# Patient Record
Sex: FEMALE | Race: WHITE | URBAN - METROPOLITAN AREA
[De-identification: names, ages, dates, MRNs, and addresses within clinical notes are randomized per-mention and may not be internally consistent; named-entity substitution may affect disease eponyms.]

---

## 2021-04-06 ENCOUNTER — OFFICE VISIT (OUTPATIENT)
Dept: FAMILY MEDICINE CLINIC | Age: 36
End: 2021-04-06
Payer: COMMERCIAL

## 2021-04-06 VITALS
TEMPERATURE: 100.6 F | WEIGHT: 150 LBS | RESPIRATION RATE: 18 BRPM | OXYGEN SATURATION: 96 % | HEIGHT: 63 IN | BODY MASS INDEX: 26.58 KG/M2

## 2021-04-06 DIAGNOSIS — Z20.822 SUSPECTED COVID-19 VIRUS INFECTION: ICD-10-CM

## 2021-04-06 DIAGNOSIS — R50.9 FEVER, UNSPECIFIED FEVER CAUSE: ICD-10-CM

## 2021-04-06 DIAGNOSIS — B34.9 VIRAL ILLNESS: Primary | ICD-10-CM

## 2021-04-06 LAB
INFLUENZA A ANTIGEN, POC: NEGATIVE
INFLUENZA B ANTIGEN, POC: NEGATIVE
Lab: NORMAL
PERFORMING INSTRUMENT: NORMAL
QC PASS/FAIL: NORMAL
SARS-COV-2, POC: NORMAL

## 2021-04-06 PROCEDURE — 87426 SARSCOV CORONAVIRUS AG IA: CPT | Performed by: NURSE PRACTITIONER

## 2021-04-06 PROCEDURE — 99213 OFFICE O/P EST LOW 20 MIN: CPT | Performed by: NURSE PRACTITIONER

## 2021-04-06 PROCEDURE — 87804 INFLUENZA ASSAY W/OPTIC: CPT | Performed by: NURSE PRACTITIONER

## 2021-04-06 RX ORDER — ACETAMINOPHEN 325 MG/1
650 TABLET ORAL ONCE
Status: COMPLETED | OUTPATIENT
Start: 2021-04-06 | End: 2021-04-06

## 2021-04-06 RX ADMIN — ACETAMINOPHEN 650 MG: 325 TABLET ORAL at 16:19

## 2021-04-06 NOTE — PATIENT INSTRUCTIONS
- Increase fluids and rest       - Anti histamine PRN       - If congested may add sudafed or Zyrtec-D       - Zinc 220 mg daily X 7 days       -Vitamin C 500mg twice daily X 7 days       - Vitamin D 1000mg daily       - Mucinex 600mg twice daily X 10 days       -Patient must quarantine until test is resulted. If send out test is negative, may discontinue quarantine. If patient is positive on rapid testing or send out, they must strictly quarantine for 10 days from symptom onset and until afebrile without the use of Tylenol/NSAIDs for 24 hours and symptoms improved. -It is optimal for a standard 14-day quarantine period for people living, working, congregating together in close proximity. An alternative quarantine regimen is as follows: Persons who were exposed may test on day 5 or later post exposure, if testing is negative and they displayed no symptoms they may come out of quarantine on day 7 and continue to monitor for symptoms for total period of 14 days.

## 2021-04-06 NOTE — PROGRESS NOTES
Chief Complaint   Generalized Body Aches (this am), Fever (100.5 this am), and Cough      History of Present Illness   Source of history provided by: patient. René Fonseca is a 38 y.o. old female who presents to walk-in care for evaluation of fever X 2 days. Associated symptoms include nausea, cough, chills and body aches. Since onset symptoms have been worsening. Patient has had no known COVID exposure. They have not been diagnosed with COVID-19 in the past 90 days. The patient is not vaccinated. Has taken ibuprofen and claritin at home with some symptomatic relief. Denies any CP, dyspnea, LE edema, abdominal pain, vomiting, rash, or lethargy. Denies any hx of asthma, recurrent bronchitis, COPD, or pneumonia. Has no history of tobacco abuse. ROS   Pertinent positives and negatives are stated within HPI, all other systems reviewed and are negative. Past Medical History:  has a past medical history of Rupture of ovarian cyst.  Surgical History:  has a past surgical history that includes laparoscopy (12/18/12). Social History:  reports that she has never smoked. She has never used smokeless tobacco. She reports current alcohol use. She reports that she does not use drugs. Family History: family history includes Asthma in her mother; Cancer in her paternal grandmother; Diabetes in her paternal grandfather; Heart Disease in her maternal grandfather and maternal grandmother. Allergies: Augmentin [amoxicillin-pot clavulanate]    Physical Exam      VS:  Temp 100.6 °F (38.1 °C) (Temporal)   Resp 18   Ht 5' 3\" (1.6 m)   Wt 150 lb (68 kg)   SpO2 96%   BMI 26.57 kg/m²    Oxygen Saturation Interpretation: Normal.    Constitutional:  Alert, development consistent with age. NAD. Head:  NC/NT. Airway patent. No TTP over maxillary and frontal sinuses. Ears: Bilateral external auditory ear canals are clear there is no cerumen, erythema, debris present.   Bilateral tympanic membranes intact, translucent, normal cone of light. Nose: Bilateral turbinates swollen. No septal deviation. Rhinorrhea present. Mouth: Posterior pharynx with mild erythema and clear postnasal drip. No tonsillar hypertrophy or exudate. Neck:  Normal ROM. Supple. No anterior cervical adenopathy noted. Lungs: CTAB without wheezes, rales, or rhonchi. CV:  Regular rate and rhythm, normal heart sounds, without pathological murmurs, ectopy, gallops, or rubs. Skin:  Normal turgor. Warm, dry, without visible rash. Lymphatic: No lymphangitis or adenopathy noted. Neurological:  Oriented. Motor functions intact. Lab / Imaging Results   (All laboratory and radiology results have been personally reviewed by myself)  Labs:  Results for orders placed or performed in visit on 04/06/21   POCT COVID-19, Antigen   Result Value Ref Range    SARS-COV-2, POC Not-Detected Not Detected    Lot Number [id number]     QC Pass/Fail pass     Performing Instrument [facility] [employer]        Imaging: All Radiology results interpreted by Radiologist unless otherwise noted. No results found. Medical Decision Making   Pt non-toxic, in no apparent distress and stable at time of discharge. Assessment/Plan   Yola was seen today for generalized body aches, fever and cough. Diagnoses and all orders for this visit:    Viral illness    Fever, unspecified fever cause  -     POCT COVID-19, Antigen  -     POCT Influenza A/B Antigen ([facility])  -     acetaminophen (TYLENOL) tablet 650 mg  -     COVID-19 Ambulatory; Future    Suspected COVID-19 virus infection        Rapid influenza test in office is negative. Rapid Covid testing in office is negative. COVID-19 swab obtained and pending, will call with results once available. Advised cautionary self-quarantine at home in the interim. If testing is positive, pt should remain out of work in the general public for at least 10 days from the start of symptoms.  Regardless of testing results, pt should also be fever free for 24 hours and symptoms should be improved overall prior to returning. Increase fluids and rest.  Advised patient to take zinc and vitamin C for immune support. Antihistamine as needed. Other symptomatic relief discussed including Tylenol prn pain/fever. Schedule virtual f/u with PCP in 7-10 days if symptoms persist.  Advised patient that there is a possibility that testing was done too soon, if send out testing is negative and patient is not feeling better or symptoms worsen she should go to her PCP or ED for further evaluation and treatment. Go to ED sooner if symptoms worsen or change. ED immediately with high or refractory fever, progressive SOB, dyspnea, CP, calf pain/swelling, shaking chills, vomiting, abdominal pain, lethargy, flank pain, or decreased urinary output. Pt verbalizes understanding and is in agreement with plan of care. All questions answered. PAM Dawson - GLORIA    *NOTE: This report was transcribed using voice recognition software. Every effort was made to ensure accuracy; however, inadvertent computerized transcription errors may be present.

## 2021-04-07 DIAGNOSIS — R50.9 FEVER, UNSPECIFIED FEVER CAUSE: ICD-10-CM

## 2021-04-08 LAB
SARS-COV-2: DETECTED
SOURCE: ABNORMAL